# Patient Record
Sex: MALE | Race: WHITE | NOT HISPANIC OR LATINO
[De-identification: names, ages, dates, MRNs, and addresses within clinical notes are randomized per-mention and may not be internally consistent; named-entity substitution may affect disease eponyms.]

---

## 2021-03-10 ENCOUNTER — APPOINTMENT (OUTPATIENT)
Dept: ENDOCRINOLOGY | Facility: CLINIC | Age: 56
End: 2021-03-10

## 2022-10-13 PROBLEM — Z00.00 ENCOUNTER FOR PREVENTIVE HEALTH EXAMINATION: Status: ACTIVE | Noted: 2022-10-13

## 2022-10-25 ENCOUNTER — NON-APPOINTMENT (OUTPATIENT)
Age: 57
End: 2022-10-25

## 2022-10-25 ENCOUNTER — APPOINTMENT (OUTPATIENT)
Dept: CARDIOLOGY | Facility: CLINIC | Age: 57
End: 2022-10-25

## 2023-02-16 ENCOUNTER — APPOINTMENT (OUTPATIENT)
Dept: CARDIOLOGY | Facility: CLINIC | Age: 58
End: 2023-02-16
Payer: MEDICAID

## 2023-02-16 PROCEDURE — 99213 OFFICE O/P EST LOW 20 MIN: CPT | Mod: 95

## 2023-02-16 NOTE — DISCUSSION/SUMMARY
[FreeTextEntry1] : pt was advised to take 80 mg atorvastatin \par and he was not sure his dose \par told to do labs end of feb and f/u in march

## 2023-02-16 NOTE — HISTORY OF PRESENT ILLNESS
[FreeTextEntry1] : pt has h/o Mixed HLD unable to get further h/o \par \par pt of fidel he agrees to telehealth, pt did not do bloodwork and has not been seen by me prefers to come to office in [place] and advised i will send him to office [place] [address] in march so i can see for a first visit. \par pt denies cp or sob unable to assess further and not a great historian. \par i was in office and pt was at home. \par 10/3/22: TC: 258 \par  \par

## 2023-04-13 ENCOUNTER — APPOINTMENT (OUTPATIENT)
Dept: CARDIOLOGY | Facility: CLINIC | Age: 58
End: 2023-04-13
Payer: MEDICAID

## 2023-04-13 VITALS — SYSTOLIC BLOOD PRESSURE: 140 MMHG | HEART RATE: 70 BPM | DIASTOLIC BLOOD PRESSURE: 80 MMHG

## 2023-04-13 VITALS — BODY MASS INDEX: 22.14 KG/M2 | HEIGHT: 71 IN | WEIGHT: 158.13 LBS

## 2023-04-13 DIAGNOSIS — R94.31 ABNORMAL ELECTROCARDIOGRAM [ECG] [EKG]: ICD-10-CM

## 2023-04-13 DIAGNOSIS — R79.82 ELEVATED C-REACTIVE PROTEIN (CRP): ICD-10-CM

## 2023-04-13 DIAGNOSIS — R79.89 OTHER SPECIFIED ABNORMAL FINDINGS OF BLOOD CHEMISTRY: ICD-10-CM

## 2023-04-13 DIAGNOSIS — R07.9 CHEST PAIN, UNSPECIFIED: ICD-10-CM

## 2023-04-13 DIAGNOSIS — E78.2 MIXED HYPERLIPIDEMIA: ICD-10-CM

## 2023-04-13 DIAGNOSIS — F17.200 NICOTINE DEPENDENCE, UNSPECIFIED, UNCOMPLICATED: ICD-10-CM

## 2023-04-13 PROCEDURE — 99204 OFFICE O/P NEW MOD 45 MIN: CPT

## 2023-04-13 PROCEDURE — 93000 ELECTROCARDIOGRAM COMPLETE: CPT

## 2023-04-13 RX ORDER — PREDNISONE 10 MG/1
10 TABLET ORAL
Refills: 0 | Status: DISCONTINUED | COMMUNITY
End: 2023-04-13

## 2023-04-13 RX ORDER — COLCHICINE 0.6 MG/1
0.6 TABLET ORAL
Refills: 0 | Status: DISCONTINUED | COMMUNITY
End: 2023-04-13

## 2023-04-13 NOTE — HISTORY OF PRESENT ILLNESS
[FreeTextEntry1] : pt has h/o Mixed HLD poor historian, elevated apob, high CRP family h/o CAD mother stents in her 50's SMOKER\par \par pt of fidel he agrees to telehealth, pt did not do bloodwork and has not been seen by me prefers to come to office in [place] and advised i will send him to office [place] [address] in march so i can see for a first visit. \par pt denies cp or sob unable to assess further and not a great historian. \par i was in office and pt was at home. \par 10/3/22: TC: 258 \par  \par  \par \par 4/13/23: pt being seen in office for first time, poor historian \par LDL to 134 on statin, \par hs crp > 10  apoB elevated 126  Lpa normal \par ALT: 51 AST: 14 \par Patient states he has not been on statin for a month, patient was in hospital with chest pain and had cath, pt stopped statin cause started on colchicine. \par pt was in [facility] did not have cath said ekg abn with chest pain and was started on colchicine, pt stopped statin because of myopathy risk but uncontrolled. \par pt poor historian said had a test and sent home but not cath and not stress test at [facility].

## 2023-04-13 NOTE — DISCUSSION/SUMMARY
[EKG obtained to assist in diagnosis and management of assessed problem(s)] : EKG obtained to assist in diagnosis and management of assessed problem(s) [FreeTextEntry1] : pt was advised to take 80 mg atorvastatin \par and he was not sure his dose \par told to do labs end of feb and f/u in march \par \par 4/13/23: pt with family h/o abn ekg hospitalized did not have cath sounds like they treated him for pericarditis but with elevated cholesterol many years \par will get CTA and 2 d echo \par pt to restart statin if pain to call. \par start atorvastatin 80 \par start ranexa baby aspirin as 81 mg po qd \par

## 2023-06-20 RX ORDER — METOPROLOL TARTRATE 100 MG/1
100 TABLET, FILM COATED ORAL
Qty: 2 | Refills: 0 | Status: ACTIVE | COMMUNITY
Start: 2023-04-13 | End: 1900-01-01

## 2023-06-26 RX ORDER — ATORVASTATIN CALCIUM 80 MG/1
80 TABLET, FILM COATED ORAL
Qty: 90 | Refills: 3 | Status: ACTIVE | COMMUNITY
Start: 2022-06-07 | End: 1900-01-01

## 2023-09-07 ENCOUNTER — APPOINTMENT (OUTPATIENT)
Dept: CARDIOLOGY | Facility: CLINIC | Age: 58
End: 2023-09-07

## 2023-10-23 ENCOUNTER — RX RENEWAL (OUTPATIENT)
Age: 58
End: 2023-10-23

## 2023-10-23 RX ORDER — RANOLAZINE 500 MG/1
500 TABLET, EXTENDED RELEASE ORAL
Qty: 180 | Refills: 2 | Status: ACTIVE | COMMUNITY
Start: 2023-04-13 | End: 1900-01-01

## 2024-11-13 ENCOUNTER — RX RENEWAL (OUTPATIENT)
Age: 59
End: 2024-11-13